# Patient Record
Sex: FEMALE | Race: WHITE | NOT HISPANIC OR LATINO | Employment: OTHER | URBAN - METROPOLITAN AREA
[De-identification: names, ages, dates, MRNs, and addresses within clinical notes are randomized per-mention and may not be internally consistent; named-entity substitution may affect disease eponyms.]

---

## 2022-06-04 ENCOUNTER — HOSPITAL ENCOUNTER (EMERGENCY)
Facility: HOSPITAL | Age: 77
Discharge: HOME | End: 2022-06-04
Attending: EMERGENCY MEDICINE | Admitting: EMERGENCY MEDICINE

## 2022-06-04 ENCOUNTER — APPOINTMENT (OUTPATIENT)
Dept: GENERAL RADIOLOGY | Facility: HOSPITAL | Age: 77
End: 2022-06-04

## 2022-06-04 VITALS
HEIGHT: 60 IN | WEIGHT: 133 LBS | RESPIRATION RATE: 18 BRPM | TEMPERATURE: 98.7 F | BODY MASS INDEX: 26.11 KG/M2 | SYSTOLIC BLOOD PRESSURE: 153 MMHG | OXYGEN SATURATION: 100 % | DIASTOLIC BLOOD PRESSURE: 90 MMHG | HEART RATE: 76 BPM

## 2022-06-04 DIAGNOSIS — S81.812A LACERATION OF LEFT LOWER EXTREMITY, INITIAL ENCOUNTER: Primary | ICD-10-CM

## 2022-06-04 PROCEDURE — 99283 EMERGENCY DEPT VISIT LOW MDM: CPT

## 2022-06-04 PROCEDURE — 73590 X-RAY EXAM OF LOWER LEG: CPT

## 2022-06-04 PROCEDURE — 90715 TDAP VACCINE 7 YRS/> IM: CPT | Performed by: EMERGENCY MEDICINE

## 2022-06-04 PROCEDURE — 90471 IMMUNIZATION ADMIN: CPT | Performed by: EMERGENCY MEDICINE

## 2022-06-04 PROCEDURE — 25010000002 TETANUS-DIPHTH-ACELL PERTUSSIS 5-2.5-18.5 LF-MCG/0.5 SUSPENSION PREFILLED SYRINGE: Performed by: EMERGENCY MEDICINE

## 2022-06-04 RX ORDER — HYDROCODONE BITARTRATE AND ACETAMINOPHEN 5; 325 MG/1; MG/1
1 TABLET ORAL EVERY 6 HOURS PRN
Status: DISCONTINUED | OUTPATIENT
Start: 2022-06-04 | End: 2022-06-05 | Stop reason: HOSPADM

## 2022-06-04 RX ORDER — LIDOCAINE HYDROCHLORIDE AND EPINEPHRINE 10; 10 MG/ML; UG/ML
10 INJECTION, SOLUTION INFILTRATION; PERINEURAL ONCE
Status: COMPLETED | OUTPATIENT
Start: 2022-06-04 | End: 2022-06-04

## 2022-06-04 RX ADMIN — HYDROCODONE BITARTRATE AND ACETAMINOPHEN 1 TABLET: 5; 325 TABLET ORAL at 21:35

## 2022-06-04 RX ADMIN — LIDOCAINE HYDROCHLORIDE,EPINEPHRINE BITARTRATE 10 ML: 10; .01 INJECTION, SOLUTION INFILTRATION; PERINEURAL at 21:35

## 2022-06-04 RX ADMIN — TETANUS TOXOID, REDUCED DIPHTHERIA TOXOID AND ACELLULAR PERTUSSIS VACCINE, ADSORBED 0.5 ML: 5; 2.5; 8; 8; 2.5 SUSPENSION INTRAMUSCULAR at 21:35

## 2022-06-04 NOTE — ED NOTES
"Pt arrives ambulatory to the triage desk via PV.    Pt states \"I fell up the deck steps and scrapped the front of my left leg.\"    Pt denies hitting her head, LOC. No thinners.    Patient was placed in face mask during first look triage.  Patient was wearing a face mask throughout encounter.  I wore personal protective equipment throughout the encounter.  Hand hygiene was performed before and after patient encounter.    "

## 2022-06-05 NOTE — ED PROVIDER NOTES
" EMERGENCY DEPARTMENT ENCOUNTER    Room Number:  19/19  Date of encounter:  6/5/2022  PCP: eMli Salcedo MD  Historian: Patient     I used full protective equipment while examining this patient.  This includes face mask, gloves and protective eyewear.  I washed my hands before entering the room and immediately upon leaving the room.  Patient was wearing a surgical mask.      HPI:  Chief Complaint: Left lower leg injury  A complete HPI/ROS/PMH/PSH/SH/FH are unobtainable due to: None    Context: Marguerite Zarco is a 76 y.o. female who presents to the ED c/o left lower leg injury.  Patient was walking up her deck stairs when she \"scraped\" her left shin on the wooden step.  She reports having a large laceration in his area.  She did not fall or hit her head.  Denies other injury or numbness/tingling in her extremities.  Last tetanus shot was over 10 years ago.      PAST MEDICAL HISTORY  Active Ambulatory Problems     Diagnosis Date Noted   • No Active Ambulatory Problems     Resolved Ambulatory Problems     Diagnosis Date Noted   • No Resolved Ambulatory Problems     Past Medical History:   Diagnosis Date   • Anxiety    • Hypertension          PAST SURGICAL HISTORY  History reviewed. No pertinent surgical history.      FAMILY HISTORY  History reviewed. No pertinent family history.      SOCIAL HISTORY  Social History     Socioeconomic History   • Marital status:    Tobacco Use   • Smoking status: Never Smoker   • Smokeless tobacco: Never Used   Substance and Sexual Activity   • Alcohol use: Yes     Comment: OCC         ALLERGIES  Patient has no known allergies.       REVIEW OF SYSTEMS  Review of Systems      All systems have been reviewed and are negative except as as discussed in the HPI    PHYSICAL EXAM    I have reviewed the triage vital signs and nursing notes.    ED Triage Vitals   Temp Heart Rate Resp BP SpO2   06/04/22 1916 06/04/22 1916 06/04/22 1916 06/04/22 1918 06/04/22 1916   98.7 °F (37.1 °C) " 88 14 149/93 96 %      Temp src Heart Rate Source Patient Position BP Location FiO2 (%)   06/04/22 1916 06/04/22 1916 06/04/22 1916 06/04/22 1916 --   Tympanic Monitor Sitting Right arm        Physical Exam  GENERAL: Awake, alert, oriented x3.  Well-developed, well-nourished elderly female.  Resting comfortably in no acute distress.  HENT: NCAT, nares patent  EYES: No scleral icterus  CV: regular rhythm, regular rate  RESPIRATORY: normal effort, clear to auscultation bilaterally  ABDOMEN: soft, nontender  MUSCULOSKELETAL: There is a V-shaped laceration on the left shin measuring approximately 6 x 4 cm.  There is mild surrounding tenderness.  Remainder of the left leg and other extremities are nontender.    NEURO: Normal strength and light touch sensation of the left lower extremity.  SKIN: warm, dry, no rash  PSYCH: Normal mood and affect      LAB RESULTS  No results found for this or any previous visit (from the past 24 hour(s)).    Ordered the above labs and independently reviewed the results.      RADIOLOGY  XR Tibia Fibula 2 View Left    Result Date: 6/4/2022  LEFT TIBIA AND FIBULA  CLINICAL HISTORY: Patient fell. Left leg injury. Abrasions.  AP and lateral views were obtained. No bony abnormalities are identified. There is no evidence of recent or old fracture involving the tibia or fibula. No radio opaque foreign bodies are evident within the soft tissues. A moderate-sized plantar calcaneal heel spur is noted.  This report was finalized on 6/4/2022 9:53 PM by Dr. Dragan Suh M.D.        I ordered the above noted radiological studies. Reviewed by me and discussed with radiologist.  See dictation for official radiology interpretation.      PROCEDURES  Procedures      MEDICATIONS GIVEN IN ER    Medications   HYDROcodone-acetaminophen (NORCO) 5-325 MG per tablet 1 tablet (1 tablet Oral Given 6/4/22 2135)   Tetanus-Diphth-Acell Pertussis (BOOSTRIX) injection 0.5 mL (0.5 mL Intramuscular Given 6/4/22 2135)    lidocaine 1% - EPINEPHrine 1:977169 (XYLOCAINE W/EPI) 1 %-1:140856 injection 10 mL (10 mL Injection Given 6/4/22 2135)         PROGRESS, DATA ANALYSIS, CONSULTS, AND MEDICAL DECISION MAKING    All labs have been independently reviewed by me.  All radiology studies have been reviewed by me and discussed with radiologist dictating the report.   EKG's independently viewed and interpreted by me.  I have reviewed the nurse's notes, vital signs, past medical history, and medication list.  Discussion below represents my analysis of pertinent findings related to patient's condition, differential diagnosis, treatment plan and final disposition.      ED Course as of 06/05/22 0124   Sat Jun 04, 2022   2156 Left tib-fib x-rays are negative acute. [WH]   2256 NIKOLE Brooke, is now at bedside to repair the patient's left leg wound. [WH]   2342 Laceration has been repaired by Rikki.  X-ray results discussed with the patient.  Patient will be discharged.  Return precautions were discussed. [WH]      ED Course User Index  [WH] Cristobal Ortega MD       AS OF 01:24 EDT VITALS:    BP - 153/90  HR - 76  TEMP - 98.7 °F (37.1 °C) (Tympanic)  O2 SATS - 100%      DIAGNOSIS  Final diagnoses:   Laceration of left lower extremity, initial encounter         DISPOSITION  DISCHARGE    Patient discharged in stable condition.    Reviewed implications of results, diagnosis, meds, responsibility to follow up, warning signs and symptoms of possible worsening, potential complications and reasons to return to ER, including redness around wound, drainage from wound, fever, or other concerning.    Patient/Family voiced understanding of above instructions.    Discussed plan for discharge, as there is no emergent indication for admission. Patient referred to primary care provider for BP management due to today's BP. Pt/family is agreeable and understands need for follow up and repeat testing.  Pt is aware that discharge does not mean that nothing is  wrong but it indicates no emergency is present that requires admission and they must continue care with follow-up as given below or physician of their choice.     FOLLOW-UP  Meli Salcedo MD  [address] [id number] [phone]    Schedule an appointment as soon as possible for a visit   For suture removal         Medication List      No changes were made to your prescriptions during this visit.           Dictated utilizing Dragon dictation     Cristobal Ortega MD  06/05/22 0127

## 2022-06-05 NOTE — ED NOTES
Patient provided with discharge, followup and wound care instructions. Patient had no further questions. Patient alert and oriented x4 upon discharge. Patient ambulated out to POV with significant other with steady gait.

## 2022-06-05 NOTE — ED PROVIDER NOTES
Laceration Repair    Date/Time: 6/5/2022 12:20 AM  Performed by: Garrick Trevino III, PA  Authorized by: Cristobal Ortega MD     Consent:     Consent obtained:  Verbal    Consent given by:  Patient    Risks discussed:  Pain and infection  Universal protocol:     Patient identity confirmed:  Verbally with patient  Anesthesia:     Anesthesia method:  Local infiltration    Local anesthetic:  Lidocaine 1% WITH epi  Laceration details:     Location:  Leg    Leg location:  L lower leg    Length (cm):  16  Pre-procedure details:     Preparation:  Patient was prepped and draped in usual sterile fashion and imaging obtained to evaluate for foreign bodies  Exploration:     Wound exploration: wound explored through full range of motion and entire depth of wound visualized    Treatment:     Area cleansed with:  Saline and Shur-Clens    Amount of cleaning:  Extensive    Irrigation solution:  Sterile saline    Irrigation volume:  1000    Irrigation method:  Pressure wash    Debridement:  Minimal  Skin repair:     Repair method:  Sutures    Suture size:  4-0    Suture material:  Nylon    Suture technique:  Running locked    Number of sutures:  22  Approximation:     Approximation:  Close  Repair type:     Repair type:  Simple  Post-procedure details:     Procedure completion:  Tolerated well, no immediate complications         Garrick Trevino III, PA  06/05/22 0022

## 2022-06-05 NOTE — DISCHARGE INSTRUCTIONS
Have sutures removed in 12 to 14 days.  Return to the emergency department for drainage from wound, redness around wound, fever, chills, or other concern.